# Patient Record
Sex: MALE | Race: OTHER | HISPANIC OR LATINO | URBAN - METROPOLITAN AREA
[De-identification: names, ages, dates, MRNs, and addresses within clinical notes are randomized per-mention and may not be internally consistent; named-entity substitution may affect disease eponyms.]

---

## 2017-07-26 VITALS
HEART RATE: 95 BPM | HEIGHT: 65 IN | RESPIRATION RATE: 14 BRPM | SYSTOLIC BLOOD PRESSURE: 140 MMHG | OXYGEN SATURATION: 94 % | TEMPERATURE: 98 F | WEIGHT: 212.08 LBS | DIASTOLIC BLOOD PRESSURE: 73 MMHG

## 2017-07-26 NOTE — H&P ADULT - NSHPLABSRESULTS_GEN_ALL_CORE
EKG NSR @96 BPM               13.8   9.8   )-----------( 235      ( 27 Jul 2017 14:42 )             39.2               PT/INR - ( 27 Jul 2017 14:43 )   PT: 11.6 sec;   INR: 1.04          PTT - ( 27 Jul 2017 14:43 )  PTT:31.4 sec

## 2017-07-26 NOTE — H&P ADULT - NEGATIVE CARDIOVASCULAR SYMPTOMS
no peripheral edema/no dyspnea on exertion/no paroxysmal nocturnal dyspnea/no palpitations/no orthopnea/no claudication

## 2017-07-26 NOTE — H&P ADULT - HISTORY OF PRESENT ILLNESS
SKELETON    Patient is a 53yo M with PMHx of HTN, HLD, DM, ?CAD s/p prior PCIs? and asthma who presented to his cardiologist complaining of SOB when climbing 2-3 flights of stairs with associated palpitations. Patient denies fatigue, CP, PND, orthopnea, N/V, hematochezia, melena, LE edema, dizziness, syncope. Patient subsequently underwent stress echo which revealed apical wall hypokinesis, 1mm horizontal ST depressions in leads II, III, AVF, and V4-V6, EF 60%. PATIENT TO BRING ALL MEDS    Patient is a 53yo M with PMHx of HTN, HLD, DM, CAD s/p prior PCI (3-5yrs ago, pt cannot remember hospital) and asthma (denies hospitalizations/intubations) who presented to his cardiologist complaining of SOB when climbing 2-3 flights of stairs with associated palpitations per MD note. Additionally, patient reports intermittent left sided chest discomfort not really associated with either exertion or rest. Patient denies fatigue, PND, orthopnea, N/V, hematochezia, melena, LE edema, dizziness, syncope. Patient subsequently underwent stress echo which revealed apical wall hypokinesis, 1mm horizontal ST depressions in leads II, III, AVF, and V4-V6, EF 60%. In light of patient's risk factors, class II anginal symptoms and abnormal stress echo, patient is now referred to [facility] for recommended cardiac catheterization with possible intervention. Patient is a 51 y/o M with PMHx of HTN, HLD, DM, CAD s/p prior PCI (3-5yrs ago, pt cannot remember hospital) and asthma (denies hospitalizations/intubations) who presented to his cardiologist complaining of SOB when climbing 2-3 flights of stairs with associated palpitations per MD note. Additionally, patient reports intermittent left sided chest discomfort not really associated with either exertion or rest. Patient denies fatigue, PND, orthopnea, N/V, hematochezia, melena, LE edema, dizziness, syncope. Patient subsequently underwent stress echo which revealed apical wall hypokinesis, 1mm horizontal ST depressions in leads II, III, AVF, and V4-V6, EF 60%. In light of patient's risk factors, class II anginal symptoms and abnormal stress echo, patient is now referred to [facility] for recommended cardiac catheterization with possible intervention.

## 2017-07-27 ENCOUNTER — INPATIENT (INPATIENT)
Facility: HOSPITAL | Age: 52
LOS: 0 days | Discharge: HOME | DRG: 247 | End: 2017-07-28
Attending: INTERNAL MEDICINE | Admitting: INTERNAL MEDICINE
Payer: COMMERCIAL

## 2017-07-27 DIAGNOSIS — Z98.890 OTHER SPECIFIED POSTPROCEDURAL STATES: Chronic | ICD-10-CM

## 2017-07-27 LAB
ALBUMIN SERPL ELPH-MCNC: 4.1 G/DL — SIGNIFICANT CHANGE UP (ref 3.3–5)
ALP SERPL-CCNC: 70 U/L — SIGNIFICANT CHANGE UP (ref 40–120)
ALT FLD-CCNC: 50 U/L — HIGH (ref 10–45)
ANION GAP SERPL CALC-SCNC: 12 MMOL/L — SIGNIFICANT CHANGE UP (ref 5–17)
APTT BLD: 31.4 SEC — SIGNIFICANT CHANGE UP (ref 27.5–37.4)
AST SERPL-CCNC: 33 U/L — SIGNIFICANT CHANGE UP (ref 10–40)
BASOPHILS NFR BLD AUTO: 0.5 % — SIGNIFICANT CHANGE UP (ref 0–2)
BILIRUB DIRECT SERPL-MCNC: <0.2 MG/DL — SIGNIFICANT CHANGE UP (ref 0–0.2)
BILIRUB INDIRECT FLD-MCNC: >0.2 MG/DL — SIGNIFICANT CHANGE UP (ref 0.2–1)
BILIRUB SERPL-MCNC: 0.4 MG/DL — SIGNIFICANT CHANGE UP (ref 0.2–1.2)
BUN SERPL-MCNC: 19 MG/DL — SIGNIFICANT CHANGE UP (ref 7–23)
CALCIUM SERPL-MCNC: 9.6 MG/DL — SIGNIFICANT CHANGE UP (ref 8.4–10.5)
CHLORIDE SERPL-SCNC: 100 MMOL/L — SIGNIFICANT CHANGE UP (ref 96–108)
CK MB CFR SERPL CALC: 1.5 NG/ML — SIGNIFICANT CHANGE UP (ref 0–6.7)
CK SERPL-CCNC: 76 U/L — SIGNIFICANT CHANGE UP (ref 30–200)
CO2 SERPL-SCNC: 26 MMOL/L — SIGNIFICANT CHANGE UP (ref 22–31)
CREAT SERPL-MCNC: 1.1 MG/DL — SIGNIFICANT CHANGE UP (ref 0.5–1.3)
EOSINOPHIL NFR BLD AUTO: 2.8 % — SIGNIFICANT CHANGE UP (ref 0–6)
GLUCOSE SERPL-MCNC: 299 MG/DL — HIGH (ref 70–99)
HBA1C BLD-MCNC: 8.3 % — HIGH (ref 4–5.6)
HCT VFR BLD CALC: 39.2 % — SIGNIFICANT CHANGE UP (ref 39–50)
HGB BLD-MCNC: 13.8 G/DL — SIGNIFICANT CHANGE UP (ref 13–17)
INR BLD: 1.04 — SIGNIFICANT CHANGE UP (ref 0.88–1.16)
LYMPHOCYTES # BLD AUTO: 40.2 % — SIGNIFICANT CHANGE UP (ref 13–44)
MCHC RBC-ENTMCNC: 31.9 PG — SIGNIFICANT CHANGE UP (ref 27–34)
MCHC RBC-ENTMCNC: 35.2 G/DL — SIGNIFICANT CHANGE UP (ref 32–36)
MCV RBC AUTO: 90.7 FL — SIGNIFICANT CHANGE UP (ref 80–100)
MONOCYTES NFR BLD AUTO: 6.6 % — SIGNIFICANT CHANGE UP (ref 2–14)
NEUTROPHILS NFR BLD AUTO: 49.9 % — SIGNIFICANT CHANGE UP (ref 43–77)
PLATELET # BLD AUTO: 235 K/UL — SIGNIFICANT CHANGE UP (ref 150–400)
POTASSIUM SERPL-MCNC: 4.1 MMOL/L — SIGNIFICANT CHANGE UP (ref 3.5–5.3)
POTASSIUM SERPL-SCNC: 4.1 MMOL/L — SIGNIFICANT CHANGE UP (ref 3.5–5.3)
PROT SERPL-MCNC: 7.6 G/DL — SIGNIFICANT CHANGE UP (ref 6–8.3)
PROTHROM AB SERPL-ACNC: 11.6 SEC — SIGNIFICANT CHANGE UP (ref 9.8–12.7)
RBC # BLD: 4.32 M/UL — SIGNIFICANT CHANGE UP (ref 4.2–5.8)
RBC # FLD: 12.5 % — SIGNIFICANT CHANGE UP (ref 10.3–16.9)
SODIUM SERPL-SCNC: 138 MMOL/L — SIGNIFICANT CHANGE UP (ref 135–145)
TROPONIN T SERPL-MCNC: <0.01 NG/ML — SIGNIFICANT CHANGE UP (ref 0–0.01)
WBC # BLD: 9.8 K/UL — SIGNIFICANT CHANGE UP (ref 3.8–10.5)
WBC # FLD AUTO: 9.8 K/UL — SIGNIFICANT CHANGE UP (ref 3.8–10.5)

## 2017-07-27 PROCEDURE — 93010 ELECTROCARDIOGRAM REPORT: CPT

## 2017-07-27 RX ORDER — HYDROCHLOROTHIAZIDE 25 MG
12.5 TABLET ORAL DAILY
Qty: 0 | Refills: 0 | Status: DISCONTINUED | OUTPATIENT
Start: 2017-07-27 | End: 2017-07-28

## 2017-07-27 RX ORDER — METOPROLOL TARTRATE 50 MG
50 TABLET ORAL DAILY
Qty: 0 | Refills: 0 | Status: DISCONTINUED | OUTPATIENT
Start: 2017-07-27 | End: 2017-07-28

## 2017-07-27 RX ORDER — DEXTROSE 50 % IN WATER 50 %
25 SYRINGE (ML) INTRAVENOUS ONCE
Qty: 0 | Refills: 0 | Status: DISCONTINUED | OUTPATIENT
Start: 2017-07-27 | End: 2017-07-27

## 2017-07-27 RX ORDER — DEXTROSE 50 % IN WATER 50 %
1 SYRINGE (ML) INTRAVENOUS ONCE
Qty: 0 | Refills: 0 | Status: DISCONTINUED | OUTPATIENT
Start: 2017-07-27 | End: 2017-07-28

## 2017-07-27 RX ORDER — ASPIRIN/CALCIUM CARB/MAGNESIUM 324 MG
81 TABLET ORAL DAILY
Qty: 0 | Refills: 0 | Status: DISCONTINUED | OUTPATIENT
Start: 2017-07-28 | End: 2017-07-28

## 2017-07-27 RX ORDER — DEXTROSE 50 % IN WATER 50 %
12.5 SYRINGE (ML) INTRAVENOUS ONCE
Qty: 0 | Refills: 0 | Status: DISCONTINUED | OUTPATIENT
Start: 2017-07-27 | End: 2017-07-27

## 2017-07-27 RX ORDER — SODIUM CHLORIDE 9 MG/ML
1000 INJECTION, SOLUTION INTRAVENOUS
Qty: 0 | Refills: 0 | Status: DISCONTINUED | OUTPATIENT
Start: 2017-07-27 | End: 2017-07-28

## 2017-07-27 RX ORDER — SODIUM CHLORIDE 9 MG/ML
500 INJECTION INTRAMUSCULAR; INTRAVENOUS; SUBCUTANEOUS
Qty: 0 | Refills: 0 | Status: DISCONTINUED | OUTPATIENT
Start: 2017-07-27 | End: 2017-07-28

## 2017-07-27 RX ORDER — CLOPIDOGREL BISULFATE 75 MG/1
75 TABLET, FILM COATED ORAL DAILY
Qty: 0 | Refills: 0 | Status: DISCONTINUED | OUTPATIENT
Start: 2017-07-28 | End: 2017-07-28

## 2017-07-27 RX ORDER — CHLORHEXIDINE GLUCONATE 213 G/1000ML
1 SOLUTION TOPICAL ONCE
Qty: 0 | Refills: 0 | Status: DISCONTINUED | OUTPATIENT
Start: 2017-07-27 | End: 2017-07-28

## 2017-07-27 RX ORDER — DEXTROSE 50 % IN WATER 50 %
1 SYRINGE (ML) INTRAVENOUS ONCE
Qty: 0 | Refills: 0 | Status: DISCONTINUED | OUTPATIENT
Start: 2017-07-27 | End: 2017-07-27

## 2017-07-27 RX ORDER — CLOPIDOGREL BISULFATE 75 MG/1
75 TABLET, FILM COATED ORAL ONCE
Qty: 0 | Refills: 0 | Status: COMPLETED | OUTPATIENT
Start: 2017-07-27 | End: 2017-07-27

## 2017-07-27 RX ORDER — ACETAMINOPHEN 500 MG
650 TABLET ORAL ONCE
Qty: 0 | Refills: 0 | Status: COMPLETED | OUTPATIENT
Start: 2017-07-27 | End: 2017-07-27

## 2017-07-27 RX ORDER — INSULIN LISPRO 100/ML
VIAL (ML) SUBCUTANEOUS
Qty: 0 | Refills: 0 | Status: DISCONTINUED | OUTPATIENT
Start: 2017-07-27 | End: 2017-07-28

## 2017-07-27 RX ORDER — INSULIN LISPRO 100/ML
VIAL (ML) SUBCUTANEOUS ONCE
Qty: 0 | Refills: 0 | Status: COMPLETED | OUTPATIENT
Start: 2017-07-27 | End: 2017-07-27

## 2017-07-27 RX ORDER — SODIUM CHLORIDE 9 MG/ML
500 INJECTION INTRAMUSCULAR; INTRAVENOUS; SUBCUTANEOUS
Qty: 0 | Refills: 0 | Status: DISCONTINUED | OUTPATIENT
Start: 2017-07-27 | End: 2017-07-27

## 2017-07-27 RX ORDER — GLUCAGON INJECTION, SOLUTION 0.5 MG/.1ML
1 INJECTION, SOLUTION SUBCUTANEOUS ONCE
Qty: 0 | Refills: 0 | Status: DISCONTINUED | OUTPATIENT
Start: 2017-07-27 | End: 2017-07-27

## 2017-07-27 RX ORDER — ATORVASTATIN CALCIUM 80 MG/1
20 TABLET, FILM COATED ORAL AT BEDTIME
Qty: 0 | Refills: 0 | Status: DISCONTINUED | OUTPATIENT
Start: 2017-07-27 | End: 2017-07-28

## 2017-07-27 RX ORDER — DEXTROSE 50 % IN WATER 50 %
25 SYRINGE (ML) INTRAVENOUS ONCE
Qty: 0 | Refills: 0 | Status: DISCONTINUED | OUTPATIENT
Start: 2017-07-27 | End: 2017-07-28

## 2017-07-27 RX ORDER — SODIUM CHLORIDE 9 MG/ML
1000 INJECTION, SOLUTION INTRAVENOUS
Qty: 0 | Refills: 0 | Status: DISCONTINUED | OUTPATIENT
Start: 2017-07-27 | End: 2017-07-27

## 2017-07-27 RX ORDER — GLUCAGON INJECTION, SOLUTION 0.5 MG/.1ML
1 INJECTION, SOLUTION SUBCUTANEOUS ONCE
Qty: 0 | Refills: 0 | Status: DISCONTINUED | OUTPATIENT
Start: 2017-07-27 | End: 2017-07-28

## 2017-07-27 RX ORDER — ASPIRIN/CALCIUM CARB/MAGNESIUM 324 MG
325 TABLET ORAL ONCE
Qty: 0 | Refills: 0 | Status: COMPLETED | OUTPATIENT
Start: 2017-07-27 | End: 2017-07-27

## 2017-07-27 RX ORDER — LISINOPRIL 2.5 MG/1
20 TABLET ORAL DAILY
Qty: 0 | Refills: 0 | Status: DISCONTINUED | OUTPATIENT
Start: 2017-07-27 | End: 2017-07-28

## 2017-07-27 RX ORDER — DEXTROSE 50 % IN WATER 50 %
12.5 SYRINGE (ML) INTRAVENOUS ONCE
Qty: 0 | Refills: 0 | Status: DISCONTINUED | OUTPATIENT
Start: 2017-07-27 | End: 2017-07-28

## 2017-07-27 RX ADMIN — ATORVASTATIN CALCIUM 20 MILLIGRAM(S): 80 TABLET, FILM COATED ORAL at 21:30

## 2017-07-27 RX ADMIN — CLOPIDOGREL BISULFATE 75 MILLIGRAM(S): 75 TABLET, FILM COATED ORAL at 17:05

## 2017-07-27 RX ADMIN — Medication: at 17:05

## 2017-07-27 RX ADMIN — Medication 650 MILLIGRAM(S): at 20:01

## 2017-07-27 RX ADMIN — Medication 325 MILLIGRAM(S): at 17:05

## 2017-07-27 RX ADMIN — Medication 2: at 21:46

## 2017-07-27 RX ADMIN — SODIUM CHLORIDE 75 MILLILITER(S): 9 INJECTION INTRAMUSCULAR; INTRAVENOUS; SUBCUTANEOUS at 17:02

## 2017-07-28 VITALS — TEMPERATURE: 97 F

## 2017-07-28 LAB
ANION GAP SERPL CALC-SCNC: 10 MMOL/L — SIGNIFICANT CHANGE UP (ref 5–17)
BASOPHILS NFR BLD AUTO: 0.5 % — SIGNIFICANT CHANGE UP (ref 0–2)
BUN SERPL-MCNC: 14 MG/DL — SIGNIFICANT CHANGE UP (ref 7–23)
CALCIUM SERPL-MCNC: 9 MG/DL — SIGNIFICANT CHANGE UP (ref 8.4–10.5)
CHLORIDE SERPL-SCNC: 103 MMOL/L — SIGNIFICANT CHANGE UP (ref 96–108)
CHOLEST SERPL-MCNC: 178 MG/DL — SIGNIFICANT CHANGE UP (ref 10–199)
CO2 SERPL-SCNC: 27 MMOL/L — SIGNIFICANT CHANGE UP (ref 22–31)
CREAT SERPL-MCNC: 0.8 MG/DL — SIGNIFICANT CHANGE UP (ref 0.5–1.3)
EOSINOPHIL NFR BLD AUTO: 3.6 % — SIGNIFICANT CHANGE UP (ref 0–6)
GLUCOSE SERPL-MCNC: 195 MG/DL — HIGH (ref 70–99)
HCT VFR BLD CALC: 41 % — SIGNIFICANT CHANGE UP (ref 39–50)
HDLC SERPL-MCNC: 31 MG/DL — LOW (ref 40–125)
HGB BLD-MCNC: 13.3 G/DL — SIGNIFICANT CHANGE UP (ref 13–17)
LIPID PNL WITH DIRECT LDL SERPL: 111 MG/DL — SIGNIFICANT CHANGE UP
LYMPHOCYTES # BLD AUTO: 47 % — HIGH (ref 13–44)
MAGNESIUM SERPL-MCNC: 2.1 MG/DL — SIGNIFICANT CHANGE UP (ref 1.6–2.6)
MCHC RBC-ENTMCNC: 30.5 PG — SIGNIFICANT CHANGE UP (ref 27–34)
MCHC RBC-ENTMCNC: 32.4 G/DL — SIGNIFICANT CHANGE UP (ref 32–36)
MCV RBC AUTO: 94 FL — SIGNIFICANT CHANGE UP (ref 80–100)
MONOCYTES NFR BLD AUTO: 7.9 % — SIGNIFICANT CHANGE UP (ref 2–14)
NEUTROPHILS NFR BLD AUTO: 41 % — LOW (ref 43–77)
PLATELET # BLD AUTO: 231 K/UL — SIGNIFICANT CHANGE UP (ref 150–400)
POTASSIUM SERPL-MCNC: 3.9 MMOL/L — SIGNIFICANT CHANGE UP (ref 3.5–5.3)
POTASSIUM SERPL-SCNC: 3.9 MMOL/L — SIGNIFICANT CHANGE UP (ref 3.5–5.3)
RBC # BLD: 4.36 M/UL — SIGNIFICANT CHANGE UP (ref 4.2–5.8)
RBC # FLD: 12.4 % — SIGNIFICANT CHANGE UP (ref 10.3–16.9)
SODIUM SERPL-SCNC: 140 MMOL/L — SIGNIFICANT CHANGE UP (ref 135–145)
TOTAL CHOLESTEROL/HDL RATIO MEASUREMENT: 5.7 RATIO — SIGNIFICANT CHANGE UP (ref 3.4–9.6)
TRIGL SERPL-MCNC: 178 MG/DL — HIGH (ref 10–149)
WBC # BLD: 9.6 K/UL — SIGNIFICANT CHANGE UP (ref 3.8–10.5)
WBC # FLD AUTO: 9.6 K/UL — SIGNIFICANT CHANGE UP (ref 3.8–10.5)

## 2017-07-28 PROCEDURE — 84484 ASSAY OF TROPONIN QUANT: CPT

## 2017-07-28 PROCEDURE — C1769: CPT

## 2017-07-28 PROCEDURE — C1874: CPT

## 2017-07-28 PROCEDURE — 93010 ELECTROCARDIOGRAM REPORT: CPT

## 2017-07-28 PROCEDURE — 82550 ASSAY OF CK (CPK): CPT

## 2017-07-28 PROCEDURE — C1725: CPT

## 2017-07-28 PROCEDURE — 36415 COLL VENOUS BLD VENIPUNCTURE: CPT

## 2017-07-28 PROCEDURE — 85025 COMPLETE CBC W/AUTO DIFF WBC: CPT

## 2017-07-28 PROCEDURE — 80076 HEPATIC FUNCTION PANEL: CPT

## 2017-07-28 PROCEDURE — 85610 PROTHROMBIN TIME: CPT

## 2017-07-28 PROCEDURE — C1889: CPT

## 2017-07-28 PROCEDURE — 93005 ELECTROCARDIOGRAM TRACING: CPT

## 2017-07-28 PROCEDURE — 83036 HEMOGLOBIN GLYCOSYLATED A1C: CPT

## 2017-07-28 PROCEDURE — 99239 HOSP IP/OBS DSCHRG MGMT >30: CPT

## 2017-07-28 PROCEDURE — C1894: CPT

## 2017-07-28 PROCEDURE — 80061 LIPID PANEL: CPT

## 2017-07-28 PROCEDURE — C1887: CPT

## 2017-07-28 PROCEDURE — C1760: CPT

## 2017-07-28 PROCEDURE — 82553 CREATINE MB FRACTION: CPT

## 2017-07-28 PROCEDURE — 85730 THROMBOPLASTIN TIME PARTIAL: CPT

## 2017-07-28 PROCEDURE — 83735 ASSAY OF MAGNESIUM: CPT

## 2017-07-28 PROCEDURE — 80048 BASIC METABOLIC PNL TOTAL CA: CPT

## 2017-07-28 RX ORDER — SITAGLIPTIN AND METFORMIN HYDROCHLORIDE 500; 50 MG/1; MG/1
1 TABLET, FILM COATED ORAL
Qty: 0 | Refills: 0 | COMMUNITY

## 2017-07-28 RX ORDER — METOPROLOL TARTRATE 50 MG
1 TABLET ORAL
Qty: 0 | Refills: 0 | COMMUNITY

## 2017-07-28 RX ORDER — ASPIRIN/CALCIUM CARB/MAGNESIUM 324 MG
1 TABLET ORAL
Qty: 30 | Refills: 11 | OUTPATIENT
Start: 2017-07-28 | End: 2018-07-22

## 2017-07-28 RX ORDER — ATORVASTATIN CALCIUM 80 MG/1
1 TABLET, FILM COATED ORAL
Qty: 0 | Refills: 0 | COMMUNITY

## 2017-07-28 RX ORDER — METOPROLOL TARTRATE 50 MG
1 TABLET ORAL
Qty: 30 | Refills: 2 | OUTPATIENT
Start: 2017-07-28 | End: 2017-10-25

## 2017-07-28 RX ORDER — CLOPIDOGREL BISULFATE 75 MG/1
1 TABLET, FILM COATED ORAL
Qty: 0 | Refills: 0 | COMMUNITY

## 2017-07-28 RX ORDER — POTASSIUM CHLORIDE 20 MEQ
40 PACKET (EA) ORAL ONCE
Qty: 0 | Refills: 0 | Status: COMPLETED | OUTPATIENT
Start: 2017-07-28 | End: 2017-07-28

## 2017-07-28 RX ORDER — ASPIRIN/CALCIUM CARB/MAGNESIUM 324 MG
1 TABLET ORAL
Qty: 0 | Refills: 0 | COMMUNITY

## 2017-07-28 RX ORDER — CLOPIDOGREL BISULFATE 75 MG/1
1 TABLET, FILM COATED ORAL
Qty: 30 | Refills: 11 | OUTPATIENT
Start: 2017-07-28 | End: 2018-07-22

## 2017-07-28 RX ORDER — ATORVASTATIN CALCIUM 80 MG/1
1 TABLET, FILM COATED ORAL
Qty: 30 | Refills: 2 | OUTPATIENT
Start: 2017-07-28 | End: 2017-10-25

## 2017-07-28 RX ADMIN — Medication 40 MILLIEQUIVALENT(S): at 12:17

## 2017-07-28 RX ADMIN — Medication 12.5 MILLIGRAM(S): at 05:25

## 2017-07-28 RX ADMIN — CLOPIDOGREL BISULFATE 75 MILLIGRAM(S): 75 TABLET, FILM COATED ORAL at 12:17

## 2017-07-28 RX ADMIN — Medication 1: at 06:28

## 2017-07-28 RX ADMIN — LISINOPRIL 20 MILLIGRAM(S): 2.5 TABLET ORAL at 06:27

## 2017-07-28 RX ADMIN — Medication 81 MILLIGRAM(S): at 12:17

## 2017-07-28 NOTE — DISCHARGE NOTE ADULT - CARE PLAN
Principal Discharge DX:	CAD (coronary artery disease)  Goal:	Maintain Low Fat, Low Cholesterol and Low Diet  Instructions for follow-up, activity and diet:	You had a cardiac angiogram with stent placement to one of your heart arteries (Left Circumflex). CONTINUE ASPIRIN AND PLAVIX. DO NOT STOP TAKING THESE MEDICATIONS UNLESS INSTRUCTED BY DR. RICHEY AS YOUR STENT CAN CLOSE RESULTING IN HEART ATTACK. Continue Metoprolol and Lipitor.  Secondary Diagnosis:	Diabetes mellitus type 2, noninsulin dependent  Instructions for follow-up, activity and diet:	DO NOT TAKE JANUMET UNTIL SUNDAY 7/30. Continue Glipizide.  Secondary Diagnosis:	HTN (hypertension)  Instructions for follow-up, activity and diet:	Monitor BP. Maintain Low Salt Diet. Continue Lisinopril, Hydrochlorothiazide, and Metoprolol.  Secondary Diagnosis:	HLD (hyperlipidemia)  Instructions for follow-up, activity and diet:	Maintain Low Cholesterol Diet. Continue Lipitor Principal Discharge DX:	CAD (coronary artery disease)  Goal:	Maintain Low Fat, Low Cholesterol and Low Diet  Instructions for follow-up, activity and diet:	You had a cardiac angiogram with stent placement to one of your heart arteries (Left Circumflex). CONTINUE ASPIRIN AND PLAVIX. DO NOT STOP TAKING THESE MEDICATIONS UNLESS INSTRUCTED BY DR. RICHEY AS YOUR STENT CAN CLOSE RESULTING IN HEART ATTACK. Continue Metoprolol and Lipitor.  Secondary Diagnosis:	Diabetes mellitus type 2, noninsulin dependent  Instructions for follow-up, activity and diet:	DO NOT TAKE JANUMET UNTIL SUNDAY 7/30. Continue Glipizide. Your Diabetes is currently not well controlled. Your Hemoglobin A1C (average measurement of how well controlled your blood sugar is over three month period) is 8.3%. Goal is <7%. Decrease Amount of Carbohydrates you eat (Rice, Pasta, Bread). Follow-up with Primary Care Physician in 1-2 week  Secondary Diagnosis:	HTN (hypertension)  Instructions for follow-up, activity and diet:	Monitor BP. Maintain Low Salt Diet. Continue Lisinopril, Hydrochlorothiazide, and Metoprolol.  Secondary Diagnosis:	HLD (hyperlipidemia)  Instructions for follow-up, activity and diet:	Maintain Low Cholesterol Diet. Continue Lipitor Principal Discharge DX:	CAD (coronary artery disease)  Goal:	Maintain Low Fat, Low Cholesterol and Low Diet  Instructions for follow-up, activity and diet:	You had a cardiac angiogram with stent placement to one of your heart arteries (Left Circumflex). CONTINUE ASPIRIN AND PLAVIX. DO NOT STOP TAKING THESE MEDICATIONS UNLESS INSTRUCTED BY DR. RICHEY AS YOUR STENT CAN CLOSE RESULTING IN HEART ATTACK. Continue Metoprolol Your cholesterol medication was increased because your bad cholesterol is high. Atorvastatin (Lipitor) changed to 40 mg from 20 mg. Have Liver Function and Cholesterol levels Checked in 1 month.  Secondary Diagnosis:	Diabetes mellitus type 2, noninsulin dependent  Instructions for follow-up, activity and diet:	DO NOT TAKE JANUMET UNTIL SUNDAY 7/30. Continue Glipizide. Your Diabetes is currently not well controlled. Your Hemoglobin A1C (average measurement of how well controlled your blood sugar is over three month period) is 8.3%. Goal is <7%. Decrease Amount of Carbohydrates you eat (Rice, Pasta, Bread). Follow-up with Primary Care Physician in 1 week.  Secondary Diagnosis:	HTN (hypertension)  Instructions for follow-up, activity and diet:	Monitor BP. Maintain Low Salt Diet. Continue Lisinopril, Hydrochlorothiazide, and Metoprolol.  Secondary Diagnosis:	HLD (hyperlipidemia)  Instructions for follow-up, activity and diet:	Maintain Low Cholesterol Diet. Your cholesterol medication was increased because your bad cholesterol is high. Atorvastatin (Lipitor) changed to 40 mg from 20 mg. Have Liver Function and Cholesterol levels Checked in 1 month.

## 2017-07-28 NOTE — DISCHARGE NOTE ADULT - HOSPITAL COURSE
51 y/o M with PMHx of HTN, HLD, DM, CAD s/p prior PCI (3-5yrs ago, pt cannot remember hospital) and asthma (denies hospitalizations/intubations) who presented to his cardiologist complaining of SOB when climbing 2-3 flights of stairs with associated palpitations per MD note. Additionally, patient reports intermittent left sided chest discomfort not really associated with either exertion or rest. Patient denies fatigue, PND, orthopnea, N/V, hematochezia, melena, LE edema, dizziness, syncope. Patient subsequently underwent stress echo which revealed apical wall hypokinesis, 1mm horizontal ST depressions in leads II, III, AVF, and V4-V6, EF 60%. In light of patient's risk factors, class II anginal symptoms and abnormal stress echo, patient referred to [facility] for recommended cardiac catheterization with possible intervention. Patient s/p cardiac cath 7/27 KEHINDE p LCx (80%), patent p/m LAD stents, LM 30%, D1 50-60%, ostial PDA, mPDA 80%, ostial PL 75%, mid PL 80%, EF 55%. Labs and telemetry reviewed. Hypokalemia K 3.9 Kdur 40 mEq PO x 1 given. Magnesium 2.1 Prescriptions for ASA, Plavix, Lipitor E-Prescribed to [facility]. 51 y/o M with PMHx of HTN, HLD, DM, CAD s/p prior PCI (3-5yrs ago, pt cannot remember hospital) and asthma (denies hospitalizations/intubations) who presented to his cardiologist complaining of SOB when climbing 2-3 flights of stairs with associated palpitations per MD note. Additionally, patient reports intermittent left sided chest discomfort not really associated with either exertion or rest. Patient denies fatigue, PND, orthopnea, N/V, hematochezia, melena, LE edema, dizziness, syncope. Patient subsequently underwent stress echo which revealed apical wall hypokinesis, 1mm horizontal ST depressions in leads II, III, AVF, and V4-V6, EF 60%. In light of patient's risk factors, class II anginal symptoms and abnormal stress echo, patient referred to [facility] for recommended cardiac catheterization with possible intervention. Patient s/p cardiac cath 7/27 KEHINDE p LCx (80%), patent p/m LAD stents, LM 30%, D1 50-60%, ostial RPDA 70%, mRPDA 80%, ostial RPL 75%, mid RPL 80%, patent prox to mid RCA stent, EF 55%. Labs and telemetry reviewed. Hypokalemia K 3.9 Kdur 40 mEq PO x 1 given. Magnesium 2.1. Patient C/P free and HD stable and cleared for discharge by Dr. Quinones. Prescriptions for ASA, Plavix, Lipitor and Metoprolol E-Prescribed to [facility]. Diabetic Teaching including Diet modification and compliance with medications provided to patient who verbalized understanding.   V/S:	BP: 120’s/70s 		HR:  70s 	RR:  18	Temp:  96.9 F  		O2 sat-96% RA   	  GENERAL: Sitting up in bed in no acute distress  CVS: + S1 S2. RRR. No murmurs, rubs or gallops.   Pulm: CTA Bilaterally. No rhonchi, wheezes, or rales.  Abd: Obese. BS x 4. Soft NT/ND.  Ext: No clubbing, cyanosis, or edema BLE. No calf tenderness BLE.   Right Groin: Soft. Non-tender. No hematoma, bleed or bruit  Distal Pulses Intact to Baseline

## 2017-07-28 NOTE — DISCHARGE NOTE ADULT - PLAN OF CARE
Maintain Low Fat, Low Cholesterol and Low Diet You had a cardiac angiogram with stent placement to one of your heart arteries (Left Circumflex). CONTINUE ASPIRIN AND PLAVIX. DO NOT STOP TAKING THESE MEDICATIONS UNLESS INSTRUCTED BY DR. RICHEY AS YOUR STENT CAN CLOSE RESULTING IN HEART ATTACK. Continue Metoprolol and Lipitor. DO NOT TAKE JANUMET UNTIL SUNDAY 7/30. Continue Glipizide. Monitor BP. Maintain Low Salt Diet. Continue Lisinopril, Hydrochlorothiazide, and Metoprolol. Maintain Low Cholesterol Diet. Continue Lipitor DO NOT TAKE JANUMET UNTIL SUNDAY 7/30. Continue Glipizide. Your Diabetes is currently not well controlled. Your Hemoglobin A1C (average measurement of how well controlled your blood sugar is over three month period) is 8.3%. Goal is <7%. Decrease Amount of Carbohydrates you eat (Rice, Pasta, Bread). Follow-up with Primary Care Physician in 1-2 week You had a cardiac angiogram with stent placement to one of your heart arteries (Left Circumflex). CONTINUE ASPIRIN AND PLAVIX. DO NOT STOP TAKING THESE MEDICATIONS UNLESS INSTRUCTED BY DR. RICHEY AS YOUR STENT CAN CLOSE RESULTING IN HEART ATTACK. Continue Metoprolol Your cholesterol medication was increased because your bad cholesterol is high. Atorvastatin (Lipitor) changed to 40 mg from 20 mg. Have Liver Function and Cholesterol levels Checked in 1 month. Maintain Low Cholesterol Diet. Your cholesterol medication was increased because your bad cholesterol is high. Atorvastatin (Lipitor) changed to 40 mg from 20 mg. Have Liver Function and Cholesterol levels Checked in 1 month. DO NOT TAKE JANUMET UNTIL SUNDAY 7/30. Continue Glipizide. Your Diabetes is currently not well controlled. Your Hemoglobin A1C (average measurement of how well controlled your blood sugar is over three month period) is 8.3%. Goal is <7%. Decrease Amount of Carbohydrates you eat (Rice, Pasta, Bread). Follow-up with Primary Care Physician in 1 week.

## 2017-07-28 NOTE — DISCHARGE NOTE ADULT - INSTRUCTIONS
You underwent a coronary angiogram and should wait 3 days before returning to ordinary activities. Do not drive for 2 days. Consult your doctor before returning to vigorous activity. You may return to work in 3-5 days. The catheter from your right groin was removed  You may shower once the dressing is removed, but avoid baths, hot tubs, or swimming for 5 days to prevent infection. If you notice bleeding from the site, hardening and pain at the site, drainage or redness from the site, coolness/paleness of the right leg ,weakness/paralysis of right leg (unable to move), swelling, or fever, please call [phone]. Please continue your aspirin and plavix as prescribed unless otherwise indicated by your cardiologist. All of your prescriptions have been sent to the pharmacy. Please follow up with Dr. Carter in 1-2 weeks. All of your needed prescriptions have been sent electronically to your pharmacy.

## 2017-07-28 NOTE — DISCHARGE NOTE ADULT - CARE PROVIDER_API CALL
Greg Hall), Cardiovascular Disease; Internal Medicine; Interventional Cardiology  [address] [place] [address]  Phone: [phone]  Fax: [phone]

## 2017-07-28 NOTE — DISCHARGE NOTE ADULT - MEDICATION SUMMARY - MEDICATIONS TO TAKE
I will START or STAY ON the medications listed below when I get home from the hospital:    Cialis 5 mg oral tablet  -- 1 tab(s) by mouth once a day, As Needed  -- Indication: For Erectile Dysfunction    aspirin 81 mg oral tablet  -- 1 tab(s) by mouth once a day  -- Indication: For Coronary Artery Disease (Heart), Stent    glipiZIDE 5 mg oral tablet  -- 1 tab(s) by mouth once a day  -- Indication: For Diabetes mellitus type 2, noninsulin dependent    Janumet 50 mg-1000 mg oral tablet  -- 1 tab(s) by mouth 2 times a day  -- Indication: For Diabetes mellitus type 2, noninsulin dependent    Lipitor 40 mg oral tablet  -- 1 tab(s) by mouth once a day (at bedtime)  -- Indication: For Hyperlipidemia (Cholesterol), Coronary Artery Disease (Heart),    lisinopril-hydrochlorothiazide 20mg-12.5mg oral tablet  -- 1 tab(s) by mouth once a day  -- Indication: For Hypertension (Blood Pressure)    Plavix 75 mg oral tablet  -- 1 tab(s) by mouth once a day  -- Indication: For Coronary Artery Disease (Heart), Stent    metoprolol succinate 50 mg oral tablet, extended release  -- 1 tab(s) by mouth once a day  -- Indication: For Hypertension (Blood Pressure), Coronary Artery Disease (Heart), I will START or STAY ON the medications listed below when I get home from the hospital:    Cialis 5 mg oral tablet  -- 1 tab(s) by mouth once a day, As Needed  -- Indication: For Erectile Dysfunction    aspirin 81 mg oral tablet  -- 1 tab(s) by mouth once a day  -- Indication: For Coronary Artery Disease (Heart), Stent    glipiZIDE 5 mg oral tablet  -- 1 tab(s) by mouth once a day  -- Indication: For Diabetes mellitus type 2, noninsulin dependent    Janumet 50 mg-1000 mg oral tablet  -- 1 tab(s) by mouth 2 times a day  -- Indication: For Diabetes mellitus type 2, noninsulin dependent    Lipitor 40 mg oral tablet  -- 1 tab(s) by mouth once a day (at bedtime)  -- Indication: For Hyperlipidemia (Cholesterol), Coronary Artery Disease (Heart),    lisinopril-hydrochlorothiazide 20mg-12.5mg oral tablet  -- 1 tab(s) by mouth once a day  -- Indication: For Hypertension (Blood Pressure)    Plavix 75 mg oral tablet  -- 1 tab(s) by mouth once a day  -- Indication: For Coronary Artery Disease (Heart), Stent    metoprolol succinate 50 mg oral tablet, extended release  -- 1 tab(s) by mouth once a day  -- Indication: For Coronary Artery Disease (Heart), Hypertension (Blood Pressure)

## 2017-07-28 NOTE — DISCHARGE NOTE ADULT - PATIENT PORTAL LINK FT
“You can access the FollowHealth Patient Portal, offered by [facility], by registering with the following website: [email or web address]”

## 2017-07-30 RX ORDER — SITAGLIPTIN AND METFORMIN HYDROCHLORIDE 500; 50 MG/1; MG/1
1 TABLET, FILM COATED ORAL
Qty: 0 | Refills: 0 | COMMUNITY
Start: 2017-07-30

## 2017-08-01 DIAGNOSIS — Z82.49 FAMILY HISTORY OF ISCHEMIC HEART DISEASE AND OTHER DISEASES OF THE CIRCULATORY SYSTEM: ICD-10-CM

## 2017-08-01 DIAGNOSIS — E78.5 HYPERLIPIDEMIA, UNSPECIFIED: ICD-10-CM

## 2017-08-01 DIAGNOSIS — Z79.84 LONG TERM (CURRENT) USE OF ORAL HYPOGLYCEMIC DRUGS: ICD-10-CM

## 2017-08-01 DIAGNOSIS — I10 ESSENTIAL (PRIMARY) HYPERTENSION: ICD-10-CM

## 2017-08-01 DIAGNOSIS — Z79.82 LONG TERM (CURRENT) USE OF ASPIRIN: ICD-10-CM

## 2017-08-01 DIAGNOSIS — Z79.01 LONG TERM (CURRENT) USE OF ANTICOAGULANTS: ICD-10-CM

## 2017-08-01 DIAGNOSIS — Z95.5 PRESENCE OF CORONARY ANGIOPLASTY IMPLANT AND GRAFT: ICD-10-CM

## 2017-08-01 DIAGNOSIS — E87.6 HYPOKALEMIA: ICD-10-CM

## 2017-08-01 DIAGNOSIS — I25.119 ATHEROSCLEROTIC HEART DISEASE OF NATIVE CORONARY ARTERY WITH UNSPECIFIED ANGINA PECTORIS: ICD-10-CM

## 2017-08-01 DIAGNOSIS — E11.9 TYPE 2 DIABETES MELLITUS WITHOUT COMPLICATIONS: ICD-10-CM

## 2017-08-01 DIAGNOSIS — J45.909 UNSPECIFIED ASTHMA, UNCOMPLICATED: ICD-10-CM

## 2017-08-01 DIAGNOSIS — I25.10 ATHEROSCLEROTIC HEART DISEASE OF NATIVE CORONARY ARTERY WITHOUT ANGINA PECTORIS: ICD-10-CM

## 2017-08-01 DIAGNOSIS — N52.9 MALE ERECTILE DYSFUNCTION, UNSPECIFIED: ICD-10-CM

## 2017-08-01 DIAGNOSIS — Z98.61 CORONARY ANGIOPLASTY STATUS: ICD-10-CM

## 2017-08-01 DIAGNOSIS — E11.65 TYPE 2 DIABETES MELLITUS WITH HYPERGLYCEMIA: ICD-10-CM

## 2017-10-31 VITALS
DIASTOLIC BLOOD PRESSURE: 71 MMHG | SYSTOLIC BLOOD PRESSURE: 133 MMHG | RESPIRATION RATE: 18 BRPM | OXYGEN SATURATION: 95 % | HEIGHT: 65 IN | WEIGHT: 210.1 LBS | TEMPERATURE: 98 F | HEART RATE: 94 BPM

## 2017-10-31 PROBLEM — I10 ESSENTIAL (PRIMARY) HYPERTENSION: Chronic | Status: ACTIVE | Noted: 2017-07-26

## 2017-10-31 PROBLEM — E11.9 TYPE 2 DIABETES MELLITUS WITHOUT COMPLICATIONS: Chronic | Status: ACTIVE | Noted: 2017-07-26

## 2017-10-31 PROBLEM — E78.5 HYPERLIPIDEMIA, UNSPECIFIED: Chronic | Status: ACTIVE | Noted: 2017-07-26

## 2017-10-31 NOTE — H&P ADULT - ASSESSMENT
53 yo male with PMH of HTN, HLD, DM, asthma (denies hospitalizations/intubations), known CAD with multiple stent, most recent @ [facility] 7/27/17  with KEHINDE prox CX, who presents with recurrent CCS class III anginal symptoms and now for cardiac cath with possible intervention for suspected CAD progression.

## 2017-10-31 NOTE — H&P ADULT - HISTORY OF PRESENT ILLNESS
51 yo male with PMH of HTN, HLD, DM, asthma (denies hospitalizations/intubations), known CAD with multiple stent, most recent @ [facility] 7/27/17 (KEHINDE prox CX, patent stent in pLAD, RCA,  mild intimal stent hyperplasia in mLAD and 30% lesion in dLM, 60 % ostial lesion in D1, 70 %  ostial lesion in RPDA, 80 % lesion in m RPDA, 75 % ostial lesion in RPLS and 80 % lesion in mRPLS) who presents to their cardiologist c/o 9/10 left sided CP which radiates to left jaw which lasts for a few seconds associated with SOB on climbing 2-3 flights of stairs ( CCS Angina Class 3 sx). Pt denies any dizziness, diaphoresis, palpitations, fatigue, LE edema, claudification, orthopnea, PND, syncope. Due to pts risk factors, CCS Angina Class 3 Sx, known CAD , pt is referred for left cardiac catheterization w/ possible intervention. 53 yo male with PMH of HTN, HLD, DM, asthma (denies hospitalizations/intubations), known CAD with multiple stent, most recent @ [facility] 7/27/17 (KEHINDE prox CX, patent stent in pLAD, RCA,  mild intimal stent hyperplasia in mLAD and 30% lesion in dLM, 60 % ostial lesion in D1, 70 %  ostial lesion in RPDA, 80 % lesion in m RPDA, 75 % ostial lesion in RPLS and 80 % lesion in mRPLS and LVEF 55 %) who presents to their cardiologist c/o 9/10 left sided CP which radiates to left jaw which lasts for a few seconds associated with SOB on climbing 2-3 flights of stairs ( CCS Angina Class 3 sx). Pt denies any dizziness, diaphoresis, palpitations, fatigue, LE edema, claudification, orthopnea, PND, syncope. Due to pts risk factors, CCS Angina Class 3 Sx, known CAD , pt is referred for left cardiac catheterization w/ possible intervention. CONFIRM MEDS ON ARRIVAL!  51 yo male with PMH of HTN, HLD, DM, asthma (denies hospitalizations/intubations), known CAD with multiple stent, most recent @ [facility] 7/27/17 (KEHINDE prox CX, patent stent in pLAD, RCA,  mild intimal stent hyperplasia in mLAD and 30% lesion in dLM, 60 % ostial lesion in D1, 70 %  ostial lesion in RPDA, 80 % lesion in m RPDA, 75 % ostial lesion in RPLS and 80 % lesion in mRPLS and LVEF 55 %) who presents to their cardiologist c/o 9/10 left sided CP which radiates to left jaw which lasts for a few seconds associated with SOB on climbing 2-3 flights of stairs ( CCS Angina Class 3 sx). Pt denies any dizziness, diaphoresis, palpitations, fatigue, LE edema, claudification, orthopnea, PND, syncope. Due to pts risk factors, CCS Angina Class 3 Sx, known CAD , pt is referred for left cardiac catheterization w/ possible intervention. 51 yo male with PMH of HTN, HLD, DM, asthma (denies hospitalizations/intubations), known CAD with multiple stent, most recent @ [facility] 7/27/17 (KEHINDE prox CX, patent stent in pLAD, RCA,  mild intimal stent hyperplasia in mLAD and 30% lesion in dLM, 60 % ostial lesion in D1, 70 %  ostial lesion in RPDA, 80 % lesion in m RPDA, 75 % ostial lesion in RPLS and 80 % lesion in mRPLS and LVEF 55 %) who presents to their cardiologist c/o 9/10 left sided CP which radiates to left jaw which lasts for a few seconds associated with SOB on climbing 2-3 flights of stairs ( CCS Angina Class 3 sx). Pt denies any dizziness, diaphoresis, palpitations, fatigue, LE edema, claudification, orthopnea, PND, syncope. Due to pts risk factors, CCS Angina Class 3 Sx, known CAD , pt is referred for left cardiac catheterization w/ possible intervention.

## 2017-11-02 ENCOUNTER — INPATIENT (INPATIENT)
Facility: HOSPITAL | Age: 52
LOS: 0 days | Discharge: HOME | DRG: 247 | End: 2017-11-03
Attending: INTERNAL MEDICINE | Admitting: INTERNAL MEDICINE
Payer: COMMERCIAL

## 2017-11-02 DIAGNOSIS — Z98.890 OTHER SPECIFIED POSTPROCEDURAL STATES: Chronic | ICD-10-CM

## 2017-11-02 LAB
ALBUMIN SERPL ELPH-MCNC: 4.3 G/DL — SIGNIFICANT CHANGE UP (ref 3.3–5)
ALP SERPL-CCNC: 69 U/L — SIGNIFICANT CHANGE UP (ref 40–120)
ALT FLD-CCNC: 37 U/L — SIGNIFICANT CHANGE UP (ref 10–45)
ANION GAP SERPL CALC-SCNC: 13 MMOL/L — SIGNIFICANT CHANGE UP (ref 5–17)
APTT BLD: 31.6 SEC — SIGNIFICANT CHANGE UP (ref 27.5–37.4)
AST SERPL-CCNC: 22 U/L — SIGNIFICANT CHANGE UP (ref 10–40)
BASOPHILS NFR BLD AUTO: 0.4 % — SIGNIFICANT CHANGE UP (ref 0–2)
BILIRUB SERPL-MCNC: 0.3 MG/DL — SIGNIFICANT CHANGE UP (ref 0.2–1.2)
BUN SERPL-MCNC: 17 MG/DL — SIGNIFICANT CHANGE UP (ref 7–23)
CALCIUM SERPL-MCNC: 9.6 MG/DL — SIGNIFICANT CHANGE UP (ref 8.4–10.5)
CHLORIDE SERPL-SCNC: 97 MMOL/L — SIGNIFICANT CHANGE UP (ref 96–108)
CHOLEST SERPL-MCNC: 133 MG/DL — SIGNIFICANT CHANGE UP (ref 10–199)
CK MB CFR SERPL CALC: 1.7 NG/ML — SIGNIFICANT CHANGE UP (ref 0–6.7)
CK SERPL-CCNC: 106 U/L — SIGNIFICANT CHANGE UP (ref 30–200)
CO2 SERPL-SCNC: 25 MMOL/L — SIGNIFICANT CHANGE UP (ref 22–31)
CREAT SERPL-MCNC: 0.8 MG/DL — SIGNIFICANT CHANGE UP (ref 0.5–1.3)
CRP SERPL-MCNC: 0.6 MG/DL — HIGH (ref 0–0.4)
EOSINOPHIL NFR BLD AUTO: 3.1 % — SIGNIFICANT CHANGE UP (ref 0–6)
GLUCOSE BLDC GLUCOMTR-MCNC: 123 MG/DL — HIGH (ref 70–99)
GLUCOSE BLDC GLUCOMTR-MCNC: 261 MG/DL — HIGH (ref 70–99)
GLUCOSE SERPL-MCNC: 310 MG/DL — HIGH (ref 70–99)
HBA1C BLD-MCNC: 7.9 % — HIGH (ref 4–5.6)
HCT VFR BLD CALC: 39.3 % — SIGNIFICANT CHANGE UP (ref 39–50)
HDLC SERPL-MCNC: 27 MG/DL — LOW (ref 40–125)
HGB BLD-MCNC: 13.8 G/DL — SIGNIFICANT CHANGE UP (ref 13–17)
INR BLD: 1.04 — SIGNIFICANT CHANGE UP (ref 0.88–1.16)
LIPID PNL WITH DIRECT LDL SERPL: 65 MG/DL — SIGNIFICANT CHANGE UP
LYMPHOCYTES # BLD AUTO: 45.7 % — HIGH (ref 13–44)
MCHC RBC-ENTMCNC: 31.6 PG — SIGNIFICANT CHANGE UP (ref 27–34)
MCHC RBC-ENTMCNC: 35.1 G/DL — SIGNIFICANT CHANGE UP (ref 32–36)
MCV RBC AUTO: 89.9 FL — SIGNIFICANT CHANGE UP (ref 80–100)
MONOCYTES NFR BLD AUTO: 7 % — SIGNIFICANT CHANGE UP (ref 2–14)
NEUTROPHILS NFR BLD AUTO: 43.8 % — SIGNIFICANT CHANGE UP (ref 43–77)
PLATELET # BLD AUTO: 258 K/UL — SIGNIFICANT CHANGE UP (ref 150–400)
POTASSIUM SERPL-MCNC: 4 MMOL/L — SIGNIFICANT CHANGE UP (ref 3.5–5.3)
POTASSIUM SERPL-SCNC: 4 MMOL/L — SIGNIFICANT CHANGE UP (ref 3.5–5.3)
PROT SERPL-MCNC: 7.5 G/DL — SIGNIFICANT CHANGE UP (ref 6–8.3)
PROTHROM AB SERPL-ACNC: 11.6 SEC — SIGNIFICANT CHANGE UP (ref 9.8–12.7)
RBC # BLD: 4.37 M/UL — SIGNIFICANT CHANGE UP (ref 4.2–5.8)
RBC # FLD: 12.5 % — SIGNIFICANT CHANGE UP (ref 10.3–16.9)
SODIUM SERPL-SCNC: 135 MMOL/L — SIGNIFICANT CHANGE UP (ref 135–145)
TOTAL CHOLESTEROL/HDL RATIO MEASUREMENT: 4.9 RATIO — SIGNIFICANT CHANGE UP (ref 3.4–9.6)
TRIGL SERPL-MCNC: 204 MG/DL — HIGH (ref 10–149)
WBC # BLD: 9.1 K/UL — SIGNIFICANT CHANGE UP (ref 3.8–10.5)
WBC # FLD AUTO: 9.1 K/UL — SIGNIFICANT CHANGE UP (ref 3.8–10.5)

## 2017-11-02 PROCEDURE — 93010 ELECTROCARDIOGRAM REPORT: CPT

## 2017-11-02 RX ORDER — TICAGRELOR 90 MG/1
90 TABLET ORAL
Qty: 0 | Refills: 0 | Status: DISCONTINUED | OUTPATIENT
Start: 2017-11-02 | End: 2017-11-03

## 2017-11-02 RX ORDER — DEXTROSE 50 % IN WATER 50 %
25 SYRINGE (ML) INTRAVENOUS ONCE
Qty: 0 | Refills: 0 | Status: DISCONTINUED | OUTPATIENT
Start: 2017-11-02 | End: 2017-11-02

## 2017-11-02 RX ORDER — SODIUM CHLORIDE 9 MG/ML
500 INJECTION INTRAMUSCULAR; INTRAVENOUS; SUBCUTANEOUS
Qty: 0 | Refills: 0 | Status: DISCONTINUED | OUTPATIENT
Start: 2017-11-02 | End: 2017-11-02

## 2017-11-02 RX ORDER — SODIUM CHLORIDE 9 MG/ML
1000 INJECTION, SOLUTION INTRAVENOUS
Qty: 0 | Refills: 0 | Status: DISCONTINUED | OUTPATIENT
Start: 2017-11-02 | End: 2017-11-02

## 2017-11-02 RX ORDER — INSULIN LISPRO 100/ML
VIAL (ML) SUBCUTANEOUS ONCE
Qty: 0 | Refills: 0 | Status: COMPLETED | OUTPATIENT
Start: 2017-11-02 | End: 2017-11-02

## 2017-11-02 RX ORDER — GLUCAGON INJECTION, SOLUTION 0.5 MG/.1ML
1 INJECTION, SOLUTION SUBCUTANEOUS ONCE
Qty: 0 | Refills: 0 | Status: DISCONTINUED | OUTPATIENT
Start: 2017-11-02 | End: 2017-11-03

## 2017-11-02 RX ORDER — CHLORHEXIDINE GLUCONATE 213 G/1000ML
1 SOLUTION TOPICAL ONCE
Qty: 0 | Refills: 0 | Status: DISCONTINUED | OUTPATIENT
Start: 2017-11-02 | End: 2017-11-02

## 2017-11-02 RX ORDER — METOPROLOL TARTRATE 50 MG
50 TABLET ORAL DAILY
Qty: 0 | Refills: 0 | Status: DISCONTINUED | OUTPATIENT
Start: 2017-11-02 | End: 2017-11-03

## 2017-11-02 RX ORDER — LISINOPRIL 2.5 MG/1
20 TABLET ORAL
Qty: 0 | Refills: 0 | Status: CANCELLED | OUTPATIENT
Start: 2017-11-03 | End: 2017-11-03

## 2017-11-02 RX ORDER — ATORVASTATIN CALCIUM 80 MG/1
40 TABLET, FILM COATED ORAL AT BEDTIME
Qty: 0 | Refills: 0 | Status: DISCONTINUED | OUTPATIENT
Start: 2017-11-02 | End: 2017-11-03

## 2017-11-02 RX ORDER — ASPIRIN/CALCIUM CARB/MAGNESIUM 324 MG
81 TABLET ORAL DAILY
Qty: 0 | Refills: 0 | Status: DISCONTINUED | OUTPATIENT
Start: 2017-11-02 | End: 2017-11-03

## 2017-11-02 RX ORDER — DEXTROSE 50 % IN WATER 50 %
1 SYRINGE (ML) INTRAVENOUS ONCE
Qty: 0 | Refills: 0 | Status: DISCONTINUED | OUTPATIENT
Start: 2017-11-02 | End: 2017-11-03

## 2017-11-02 RX ORDER — DEXTROSE 50 % IN WATER 50 %
25 SYRINGE (ML) INTRAVENOUS ONCE
Qty: 0 | Refills: 0 | Status: DISCONTINUED | OUTPATIENT
Start: 2017-11-02 | End: 2017-11-03

## 2017-11-02 RX ORDER — DEXTROSE 50 % IN WATER 50 %
1 SYRINGE (ML) INTRAVENOUS ONCE
Qty: 0 | Refills: 0 | Status: DISCONTINUED | OUTPATIENT
Start: 2017-11-02 | End: 2017-11-02

## 2017-11-02 RX ORDER — DEXTROSE 50 % IN WATER 50 %
12.5 SYRINGE (ML) INTRAVENOUS ONCE
Qty: 0 | Refills: 0 | Status: DISCONTINUED | OUTPATIENT
Start: 2017-11-02 | End: 2017-11-03

## 2017-11-02 RX ORDER — DEXTROSE 50 % IN WATER 50 %
12.5 SYRINGE (ML) INTRAVENOUS ONCE
Qty: 0 | Refills: 0 | Status: DISCONTINUED | OUTPATIENT
Start: 2017-11-02 | End: 2017-11-02

## 2017-11-02 RX ORDER — GLUCAGON INJECTION, SOLUTION 0.5 MG/.1ML
1 INJECTION, SOLUTION SUBCUTANEOUS ONCE
Qty: 0 | Refills: 0 | Status: DISCONTINUED | OUTPATIENT
Start: 2017-11-02 | End: 2017-11-02

## 2017-11-02 RX ORDER — SODIUM CHLORIDE 9 MG/ML
1000 INJECTION, SOLUTION INTRAVENOUS
Qty: 0 | Refills: 0 | Status: DISCONTINUED | OUTPATIENT
Start: 2017-11-02 | End: 2017-11-03

## 2017-11-02 RX ORDER — LISINOPRIL 2.5 MG/1
40 TABLET ORAL
Qty: 0 | Refills: 0 | Status: DISCONTINUED | OUTPATIENT
Start: 2017-11-02 | End: 2017-11-03

## 2017-11-02 RX ORDER — INSULIN LISPRO 100/ML
VIAL (ML) SUBCUTANEOUS
Qty: 0 | Refills: 0 | Status: DISCONTINUED | OUTPATIENT
Start: 2017-11-02 | End: 2017-11-03

## 2017-11-02 RX ORDER — INFLUENZA VIRUS VACCINE 15; 15; 15; 15 UG/.5ML; UG/.5ML; UG/.5ML; UG/.5ML
0.5 SUSPENSION INTRAMUSCULAR ONCE
Qty: 0 | Refills: 0 | Status: COMPLETED | OUTPATIENT
Start: 2017-11-02 | End: 2017-11-03

## 2017-11-02 RX ADMIN — Medication 6: at 23:18

## 2017-11-02 RX ADMIN — Medication 50 MILLIGRAM(S): at 18:07

## 2017-11-02 RX ADMIN — TICAGRELOR 90 MILLIGRAM(S): 90 TABLET ORAL at 18:38

## 2017-11-02 RX ADMIN — LISINOPRIL 40 MILLIGRAM(S): 2.5 TABLET ORAL at 20:00

## 2017-11-02 RX ADMIN — Medication: at 14:24

## 2017-11-03 VITALS — TEMPERATURE: 98 F

## 2017-11-03 LAB
ANION GAP SERPL CALC-SCNC: 14 MMOL/L — SIGNIFICANT CHANGE UP (ref 5–17)
BASOPHILS NFR BLD AUTO: 0.3 % — SIGNIFICANT CHANGE UP (ref 0–2)
BUN SERPL-MCNC: 13 MG/DL — SIGNIFICANT CHANGE UP (ref 7–23)
CALCIUM SERPL-MCNC: 9.5 MG/DL — SIGNIFICANT CHANGE UP (ref 8.4–10.5)
CHLORIDE SERPL-SCNC: 96 MMOL/L — SIGNIFICANT CHANGE UP (ref 96–108)
CO2 SERPL-SCNC: 25 MMOL/L — SIGNIFICANT CHANGE UP (ref 22–31)
CREAT SERPL-MCNC: 0.75 MG/DL — SIGNIFICANT CHANGE UP (ref 0.5–1.3)
EOSINOPHIL NFR BLD AUTO: 2.6 % — SIGNIFICANT CHANGE UP (ref 0–6)
GLUCOSE BLDC GLUCOMTR-MCNC: 198 MG/DL — HIGH (ref 70–99)
GLUCOSE BLDC GLUCOMTR-MCNC: 226 MG/DL — HIGH (ref 70–99)
GLUCOSE SERPL-MCNC: 224 MG/DL — HIGH (ref 70–99)
HCT VFR BLD CALC: 39.2 % — SIGNIFICANT CHANGE UP (ref 39–50)
HGB BLD-MCNC: 13.2 G/DL — SIGNIFICANT CHANGE UP (ref 13–17)
LYMPHOCYTES # BLD AUTO: 38.9 % — SIGNIFICANT CHANGE UP (ref 13–44)
MAGNESIUM SERPL-MCNC: 1.9 MG/DL — SIGNIFICANT CHANGE UP (ref 1.6–2.6)
MCHC RBC-ENTMCNC: 30.8 PG — SIGNIFICANT CHANGE UP (ref 27–34)
MCHC RBC-ENTMCNC: 33.7 G/DL — SIGNIFICANT CHANGE UP (ref 32–36)
MCV RBC AUTO: 91.4 FL — SIGNIFICANT CHANGE UP (ref 80–100)
MONOCYTES NFR BLD AUTO: 6.7 % — SIGNIFICANT CHANGE UP (ref 2–14)
NEUTROPHILS NFR BLD AUTO: 51.5 % — SIGNIFICANT CHANGE UP (ref 43–77)
PLATELET # BLD AUTO: 247 K/UL — SIGNIFICANT CHANGE UP (ref 150–400)
POTASSIUM SERPL-MCNC: 3.7 MMOL/L — SIGNIFICANT CHANGE UP (ref 3.5–5.3)
POTASSIUM SERPL-SCNC: 3.7 MMOL/L — SIGNIFICANT CHANGE UP (ref 3.5–5.3)
RBC # BLD: 4.29 M/UL — SIGNIFICANT CHANGE UP (ref 4.2–5.8)
RBC # FLD: 12.6 % — SIGNIFICANT CHANGE UP (ref 10.3–16.9)
SODIUM SERPL-SCNC: 135 MMOL/L — SIGNIFICANT CHANGE UP (ref 135–145)
WBC # BLD: 10.1 K/UL — SIGNIFICANT CHANGE UP (ref 3.8–10.5)
WBC # FLD AUTO: 10.1 K/UL — SIGNIFICANT CHANGE UP (ref 3.8–10.5)

## 2017-11-03 PROCEDURE — C1769: CPT

## 2017-11-03 PROCEDURE — 83735 ASSAY OF MAGNESIUM: CPT

## 2017-11-03 PROCEDURE — 36415 COLL VENOUS BLD VENIPUNCTURE: CPT

## 2017-11-03 PROCEDURE — C1889: CPT

## 2017-11-03 PROCEDURE — 82553 CREATINE MB FRACTION: CPT

## 2017-11-03 PROCEDURE — C1887: CPT

## 2017-11-03 PROCEDURE — C1894: CPT

## 2017-11-03 PROCEDURE — 80048 BASIC METABOLIC PNL TOTAL CA: CPT

## 2017-11-03 PROCEDURE — C1874: CPT

## 2017-11-03 PROCEDURE — 93005 ELECTROCARDIOGRAM TRACING: CPT

## 2017-11-03 PROCEDURE — 93010 ELECTROCARDIOGRAM REPORT: CPT

## 2017-11-03 PROCEDURE — 82962 GLUCOSE BLOOD TEST: CPT

## 2017-11-03 PROCEDURE — 80061 LIPID PANEL: CPT

## 2017-11-03 PROCEDURE — 80053 COMPREHEN METABOLIC PANEL: CPT

## 2017-11-03 PROCEDURE — 85025 COMPLETE CBC W/AUTO DIFF WBC: CPT

## 2017-11-03 PROCEDURE — 86140 C-REACTIVE PROTEIN: CPT

## 2017-11-03 PROCEDURE — 90686 IIV4 VACC NO PRSV 0.5 ML IM: CPT

## 2017-11-03 PROCEDURE — C1725: CPT

## 2017-11-03 PROCEDURE — 83036 HEMOGLOBIN GLYCOSYLATED A1C: CPT

## 2017-11-03 PROCEDURE — 82550 ASSAY OF CK (CPK): CPT

## 2017-11-03 PROCEDURE — 85610 PROTHROMBIN TIME: CPT

## 2017-11-03 PROCEDURE — 85730 THROMBOPLASTIN TIME PARTIAL: CPT

## 2017-11-03 PROCEDURE — C1760: CPT

## 2017-11-03 RX ORDER — ATORVASTATIN CALCIUM 80 MG/1
1 TABLET, FILM COATED ORAL
Qty: 0 | Refills: 0 | COMMUNITY
Start: 2017-11-03

## 2017-11-03 RX ORDER — MAGNESIUM OXIDE 400 MG ORAL TABLET 241.3 MG
400 TABLET ORAL ONCE
Qty: 0 | Refills: 0 | Status: COMPLETED | OUTPATIENT
Start: 2017-11-03 | End: 2017-11-03

## 2017-11-03 RX ORDER — POTASSIUM CHLORIDE 20 MEQ
40 PACKET (EA) ORAL ONCE
Qty: 0 | Refills: 0 | Status: COMPLETED | OUTPATIENT
Start: 2017-11-03 | End: 2017-11-03

## 2017-11-03 RX ORDER — SITAGLIPTIN AND METFORMIN HYDROCHLORIDE 500; 50 MG/1; MG/1
1 TABLET, FILM COATED ORAL
Qty: 0 | Refills: 0 | COMMUNITY

## 2017-11-03 RX ORDER — ASPIRIN/CALCIUM CARB/MAGNESIUM 324 MG
1 TABLET ORAL
Qty: 0 | Refills: 0 | COMMUNITY
Start: 2017-11-03

## 2017-11-03 RX ORDER — TADALAFIL 10 MG/1
1 TABLET, FILM COATED ORAL
Qty: 0 | Refills: 0 | COMMUNITY

## 2017-11-03 RX ORDER — LISINOPRIL 2.5 MG/1
1 TABLET ORAL
Qty: 0 | Refills: 0 | COMMUNITY

## 2017-11-03 RX ORDER — TICAGRELOR 90 MG/1
1 TABLET ORAL
Qty: 60 | Refills: 11 | OUTPATIENT
Start: 2017-11-03 | End: 2018-10-28

## 2017-11-03 RX ORDER — METOPROLOL TARTRATE 50 MG
1 TABLET ORAL
Qty: 0 | Refills: 0 | COMMUNITY
Start: 2017-11-03

## 2017-11-03 RX ADMIN — Medication 6: at 07:46

## 2017-11-03 RX ADMIN — Medication 81 MILLIGRAM(S): at 10:52

## 2017-11-03 RX ADMIN — LISINOPRIL 20 MILLIGRAM(S): 2.5 TABLET ORAL at 06:31

## 2017-11-03 RX ADMIN — MAGNESIUM OXIDE 400 MG ORAL TABLET 400 MILLIGRAM(S): 241.3 TABLET ORAL at 10:52

## 2017-11-03 RX ADMIN — TICAGRELOR 90 MILLIGRAM(S): 90 TABLET ORAL at 06:31

## 2017-11-03 RX ADMIN — Medication 50 MILLIGRAM(S): at 06:31

## 2017-11-03 RX ADMIN — Medication 40 MILLIEQUIVALENT(S): at 10:53

## 2017-11-03 RX ADMIN — INFLUENZA VIRUS VACCINE 0.5 MILLILITER(S): 15; 15; 15; 15 SUSPENSION INTRAMUSCULAR at 10:56

## 2017-11-03 NOTE — DISCHARGE NOTE ADULT - MEDICATION SUMMARY - MEDICATIONS TO TAKE
I will START or STAY ON the medications listed below when I get home from the hospital:    Cialis 5 mg oral tablet  -- 1 tab(s) by mouth once a day, As Needed  -- Indication: For as needed    aspirin 81 mg oral delayed release tablet  -- 1 tab(s) by mouth once a day  -- Indication: For BLOOD THINNER FOR HEART DISEASE: STENT: IMPORTANT TO TAKE EVERY DAY: NEVER MISS A DOSE!    Janumet 50 mg-1000 mg oral tablet  -- 1 tab(s) by mouth 2 times a day    DO NOT TAKE FOR TWO DAYS: YOU MAY RESTART 11/5/2017  -- Indication: For DIABETES: DO NOT TAKE FOR TWO DAYS: RESTART 11/5/2017    Glucotrol 5 mg oral tablet  -- 1 tab(s) by mouth once a day  -- Indication: For DIABETES    atorvastatin 40 mg oral tablet  -- 1 tab(s) by mouth once a day (at bedtime)  -- Indication: For CHOLESTEROL    lisinopril-hydroCHLOROthiazide 20 mg-12.5 mg oral tablet  -- 1 tab(s) by mouth once a day  -- Indication: For BLOOD PRESSURE    Brilinta (ticagrelor) 90 mg oral tablet  -- 1 tab(s) by mouth 2 times a day   -- It is very important that you take or use this exactly as directed.  Do not skip doses or discontinue unless directed by your doctor.  Obtain medical advice before taking any non-prescription drugs as some may affect the action of this medication.    -- Indication: For BLOOD THINNER FOR HEART DISEASE/STENT IN YOUR HEART: NEVER MISS A DOSE (THIS IS INSTEAD OF YOUR PLAVIX)    metoprolol succinate 50 mg oral tablet, extended release  -- 1 tab(s) by mouth once a day  -- Indication: For BLOOD PRESSURE

## 2017-11-03 NOTE — DISCHARGE NOTE ADULT - SECONDARY DIAGNOSIS.
no exercise/no heavy lifting/quiet play/no sports/gym HTN (hypertension) HLD (hyperlipidemia) DM (diabetes mellitus)

## 2017-11-03 NOTE — DISCHARGE NOTE ADULT - CARE PROVIDER_API CALL
Greg Hall), Cardiovascular Disease; Internal Medicine; Interventional Cardiology  [address] [place] [address]  Phone: [phone]  Fax: [phone]

## 2017-11-03 NOTE — DISCHARGE NOTE ADULT - MEDICATION SUMMARY - MEDICATIONS TO STOP TAKING
I will STOP taking the medications listed below when I get home from the hospital:    Plavix 75 mg oral tablet  -- 1 tab(s) by mouth once a day    lisinopril 40 mg oral tablet  -- 1 tab(s) by mouth once a day in pm

## 2017-11-03 NOTE — DISCHARGE NOTE ADULT - PATIENT PORTAL LINK FT
“You can access the FollowHealth Patient Portal, offered by [facility], by registering with the following website: [email or web address]”

## 2017-11-03 NOTE — DISCHARGE NOTE ADULT - PLAN OF CARE
Please take Aspirin 81 mg daily, Brilinta 90 mg orally TWICE A DAY (instead of Plavix), Lipitor 40 mg daily, Toprol Xl 50 mg daily You underwent a cardiac catheterization (10/2/2017) and the blockage in your Right Posterior Lateral Branch was opened with placement of two stents. PLEASE TAKE ASPIRIN 81 MG DAILY AND BRILINTA 90 MG ORALLY TWICE A DAY (THIS IS INSTEAD OF PLAVIX 75 MG DAILY).   NEVER MISS A DOSE OF ASPIRIN OR BRILINTA; IF YOU DO, YOU ARE AT RISK OF YOUR STENT CLOSING AND HAVING A HEART ATTACK. DO NOT STOP THESE TWO MEDICATIONS UNLESS INSTRUCTED TO DO SO BY YOUR CARDIOLOGIST.  Please follow up with Dr. Carter in one week for a check up on your heart Please continue to take your Toprol Xl 50 mg daily, Lisinopril-HCTZ 20-12.5 mg daily. Your blood pressure if very well controlled and you have only been receiving your Toprol XL 50 mg daily and Lisinopril 20-12.5 mg daily while you have been here with us. Please stop taking the additional Lisinopril 40 mg daily at bedtime.   Please follow up with Dr. Carter in one week for a check up. Please continue your Lipitor 40 mg daily. Please continue your Glipizide XL 5 mg daily. DO NOT TAKE YOUR JANUMET FOR TWO DAYS: RESTART 11/5/2017.  Your Hemoglobin A1c is 7.9%. The number measures your average blood sugar over the last three months. Your goal Hemoglobin A1c is 7.0% or LESS. Ways to lower this number (Hemoglobin A1c) include: diet, exercise, monitoring your fingersticks, adhering to your medication regimen and regular follow up with your doctor.

## 2017-11-03 NOTE — DISCHARGE NOTE ADULT - INSTRUCTIONS
•	Your procedure was done through your groin   •	You do not need to keep this area covered and you may shower  •	Please avoid any heavy lifting  (no more than 3 to 5 lbs) or strenuous activity for five days  •	If you develop any swelling, bleeding, hardening of the skin (hematoma formation), acute pain, numbness/tingling  in your  leg please contact your doctor immediately or call our 24/7 line: [phone]

## 2017-11-03 NOTE — DISCHARGE NOTE ADULT - HOSPITAL COURSE
51 yo male with PMH of HTN, HLD, DM, asthma (denies hospitalizations/intubations), known CAD with multiple stents presented to their cardiologist c/o 9/10 left sided CP which radiates to left jaw which lasts for a few seconds associated with SOB on climbing 2-3 flights of stairs ( CCS Angina Class 3 sx).  Pt was referred for cardiac catheterization (11/2/2017) revealing dLM 30%, ostial LAD 30%, prior prox stent patent, ostial LCx 50%, prior prox sent patent, dRCA 50% lesion calcified and dominant, mRPLS 80% stenosis, dRPLS 80% stenosis. Pt is now s/p successful KEHINDE to mid RPLS and distal RPLS. Pt was admitted overnight for monitoring and IV hydration. Patient has been seen and examined at bedside this morning. Pt with no complaints and is out bed, ambulating. Right groin s/p perclose placement stable: no hematoma, no bleed, distal pulse intact. Lab values have been reviewed and remain stable: Hypokalemia of 3.7 supplemented with Kdur 40 mEQ orally x one dose this morning and Magnesium Level of 1.9 supplemented with Magnesium Oxide 400 mg orally x one dose. Vital signs have remained stable overnight. Home medication reviewed patient will stop Plavix and start taking Brilinta 90 mg orally BID. In addition patient will continue ASA 81 mg daily, Toprol XL 50 mg daily, Lipitor 40 mg daily, Lisinopril-HCTZ 20-12.5 mg daily. Patient was also taking an addition Lisinopril 40 mg daily at night; patient has been instructed to stop taking this nightly dose of Lisinopril and BP trend is normotensive. Patient will follow up with Dr. Carter in one week for a post PCI check up. Pt will be accompanied home by his family today. Patient has been cleared for discharge per Dr. Espinal. 53 yo male with PMH of HTN, HLD, DM, asthma (denies hospitalizations/intubations), known CAD with multiple stents presented to their cardiologist c/o 9/10 left sided CP which radiates to left jaw which lasts for a few seconds associated with SOB on climbing 2-3 flights of stairs ( CCS Angina Class 3 sx).  Pt was referred for cardiac catheterization (11/2/2017) revealing dLM 30%, ostial LAD 30%, prior prox stent patent, ostial LCx 50%, prior prox sent patent, dRCA 50% lesion calcified and dominant, mRPLS 80% stenosis, dRPLS 80% stenosis. Pt is now s/p successful KEHINDE to mid RPLS and distal RPLS. Pt was admitted overnight for monitoring and IV hydration. Patient has been seen and examined at bedside this morning. Pt with no complaints and is out bed, ambulating. Right groin s/p perclose placement stable: no hematoma, no bleed, distal pulse intact. Lab values have been reviewed and remain stable: Hypokalemia of 3.7 supplemented with Kdur 40 mEQ orally x one dose this morning and Magnesium Level of 1.9 supplemented with Magnesium Oxide 400 mg orally x one dose. Vital signs have remained stable overnight. Home medication reviewed patient will stop Plavix and start taking Brilinta 90 mg orally BID (covered under insurance with $25.00 copayement-patient is comfortable with payment). In addition patient will continue ASA 81 mg daily, Toprol XL 50 mg daily, Lipitor 40 mg daily, Lisinopril-HCTZ 20-12.5 mg daily. Patient was also taking an addition Lisinopril 40 mg daily at night; patient has been instructed to stop taking this nightly dose of Lisinopril and BP trend is normotensive. Patient will follow up with Dr. Carter in one week for a post PCI check up. Pt will be accompanied home by his family today. Patient has been cleared for discharge per Dr. Espinal.

## 2017-11-05 RX ORDER — SITAGLIPTIN AND METFORMIN HYDROCHLORIDE 500; 50 MG/1; MG/1
1 TABLET, FILM COATED ORAL
Qty: 0 | Refills: 0 | COMMUNITY
Start: 2017-11-05

## 2017-11-07 DIAGNOSIS — E87.6 HYPOKALEMIA: ICD-10-CM

## 2017-11-07 DIAGNOSIS — Z79.84 LONG TERM (CURRENT) USE OF ORAL HYPOGLYCEMIC DRUGS: ICD-10-CM

## 2017-11-07 DIAGNOSIS — E78.5 HYPERLIPIDEMIA, UNSPECIFIED: ICD-10-CM

## 2017-11-07 DIAGNOSIS — J45.909 UNSPECIFIED ASTHMA, UNCOMPLICATED: ICD-10-CM

## 2017-11-07 DIAGNOSIS — I10 ESSENTIAL (PRIMARY) HYPERTENSION: ICD-10-CM

## 2017-11-07 DIAGNOSIS — I25.110 ATHEROSCLEROTIC HEART DISEASE OF NATIVE CORONARY ARTERY WITH UNSTABLE ANGINA PECTORIS: ICD-10-CM

## 2017-11-07 DIAGNOSIS — Z79.02 LONG TERM (CURRENT) USE OF ANTITHROMBOTICS/ANTIPLATELETS: ICD-10-CM

## 2017-11-07 DIAGNOSIS — E11.9 TYPE 2 DIABETES MELLITUS WITHOUT COMPLICATIONS: ICD-10-CM

## 2017-11-07 DIAGNOSIS — Z95.5 PRESENCE OF CORONARY ANGIOPLASTY IMPLANT AND GRAFT: ICD-10-CM

## 2019-08-31 NOTE — PATIENT PROFILE ADULT. - FUNCTIONAL SCREEN CURRENT LEVEL: DRESSING, MLM
Symptoms started this morning and worsened through the day.  No emesis but nausea all day.  No fever but chills this evening.     (0) independent

## 2021-06-08 NOTE — PATIENT PROFILE ADULT. - NUTRITION PROFILE
Ongoing SW/CM Assessment/Plan of Care Note     See SW/CM flowsheets for goals and other objective data.    Patient/Family discharge goal (s):  Goal #1: Psychosocial needs assessed          PT Recommendation:     Recommendation for Discharge: PT IL: Patient requires 24 hour nonskilled assistance to perform mobility and/or ADLs safely, Patient needs nondaily skilled therapy after discharge, Patient needs daily, skilled therapy for 1-3 hours a day by at least two disciplines    OT Recommendation:     Recommendations for Discharge: OT IL: Patient requires 24 hour nonskilled assistance to perform mobility and/or ADLs safely, Patient needs nondaily skilled therapy after discharge, Patient needs daily, skilled therapy for 1-3 hours a day by at least two disciplines    SLP Recommendation:  Recommendations for Discharge: SLP: 24 hour supervision    Disposition:   [facility] for ongoing therapy    Progress note:   ANANT spoke with pt's son Orestes and [facility] for [facility]. Per [facility] and [facility] liaison [facility] will [facility] an available bed on Thursday.          no indicators present

## 2022-03-28 NOTE — DISCHARGE NOTE ADULT - CARE PLAN
no Principal Discharge DX:	Coronary artery disease  Goal:	Please take Aspirin 81 mg daily, Brilinta 90 mg orally TWICE A DAY (instead of Plavix), Lipitor 40 mg daily, Toprol Xl 50 mg daily  Instructions for follow-up, activity and diet:	You underwent a cardiac catheterization (10/2/2017) and the blockage in your Right Posterior Lateral Branch was opened with placement of two stents. PLEASE TAKE ASPIRIN 81 MG DAILY AND BRILINTA 90 MG ORALLY TWICE A DAY (THIS IS INSTEAD OF PLAVIX 75 MG DAILY).   NEVER MISS A DOSE OF ASPIRIN OR BRILINTA; IF YOU DO, YOU ARE AT RISK OF YOUR STENT CLOSING AND HAVING A HEART ATTACK. DO NOT STOP THESE TWO MEDICATIONS UNLESS INSTRUCTED TO DO SO BY YOUR CARDIOLOGIST.  Please follow up with Dr. Carter in one week for a check up on your heart  Secondary Diagnosis:	HTN (hypertension)  Goal:	Please continue to take your Toprol Xl 50 mg daily, Lisinopril-HCTZ 20-12.5 mg daily.  Instructions for follow-up, activity and diet:	Your blood pressure if very well controlled and you have only been receiving your Toprol XL 50 mg daily and Lisinopril 20-12.5 mg daily while you have been here with us. Please stop taking the additional Lisinopril 40 mg daily at bedtime.   Please follow up with Dr. Carter in one week for a check up.  Secondary Diagnosis:	HLD (hyperlipidemia)  Goal:	Please continue your Lipitor 40 mg daily.  Secondary Diagnosis:	DM (diabetes mellitus)  Goal:	Please continue your Glipizide XL 5 mg daily.  Instructions for follow-up, activity and diet:	DO NOT TAKE YOUR JANUMET FOR TWO DAYS: RESTART 11/5/2017.  Your Hemoglobin A1c is 7.9%. The number measures your average blood sugar over the last three months. Your goal Hemoglobin A1c is 7.0% or LESS. Ways to lower this number (Hemoglobin A1c) include: diet, exercise, monitoring your fingersticks, adhering to your medication regimen and regular follow up with your doctor.

## 2024-12-13 NOTE — DISCHARGE NOTE ADULT - ABILITY TO HEAR (WITH HEARING AID OR HEARING APPLIANCE IF NORMALLY USED):
Adequate: hears normal conversation without difficulty
Standing/Walking/Toileting/Moving from bed to chair